# Patient Record
Sex: MALE | Race: BLACK OR AFRICAN AMERICAN | NOT HISPANIC OR LATINO | Employment: FULL TIME | URBAN - METROPOLITAN AREA
[De-identification: names, ages, dates, MRNs, and addresses within clinical notes are randomized per-mention and may not be internally consistent; named-entity substitution may affect disease eponyms.]

---

## 2022-01-08 ENCOUNTER — HOSPITAL ENCOUNTER (EMERGENCY)
Facility: HOSPITAL | Age: 37
Discharge: HOME | End: 2022-01-08
Attending: EMERGENCY MEDICINE
Payer: MEDICAID

## 2022-01-08 VITALS
RESPIRATION RATE: 18 BRPM | SYSTOLIC BLOOD PRESSURE: 120 MMHG | TEMPERATURE: 98 F | OXYGEN SATURATION: 100 % | HEIGHT: 72 IN | DIASTOLIC BLOOD PRESSURE: 68 MMHG | WEIGHT: 175 LBS | BODY MASS INDEX: 23.7 KG/M2 | HEART RATE: 80 BPM

## 2022-01-08 DIAGNOSIS — S30.22XA TRAUMATIC SCROTAL HEMATOMA, INITIAL ENCOUNTER: Primary | ICD-10-CM

## 2022-01-08 DIAGNOSIS — S39.94XA INJURY TO SCROTUM, INITIAL ENCOUNTER: ICD-10-CM

## 2022-01-08 LAB
BILIRUB UR QL STRIP: NEGATIVE
CLARITY UR: CLEAR
COLOR UR: YELLOW
GLUCOSE UR QL STRIP: NEGATIVE
HGB UR QL STRIP: ABNORMAL
KETONES UR QL STRIP: NEGATIVE
LEUKOCYTE ESTERASE UR QL STRIP: NEGATIVE
NITRITE UR QL STRIP: NEGATIVE
PH UR STRIP: 6 [PH] (ref 5–8)
PROT UR QL STRIP: NEGATIVE
SP GR UR STRIP: >=1.03 (ref 1–1.03)
URN SPEC COLLECT METH UR: ABNORMAL
UROBILINOGEN UR STRIP-ACNC: NEGATIVE EU/DL

## 2022-01-08 PROCEDURE — 25000003 PHARM REV CODE 250: Performed by: EMERGENCY MEDICINE

## 2022-01-08 PROCEDURE — 81003 URINALYSIS AUTO W/O SCOPE: CPT | Performed by: EMERGENCY MEDICINE

## 2022-01-08 PROCEDURE — 99284 EMERGENCY DEPT VISIT MOD MDM: CPT | Mod: 25

## 2022-01-08 RX ORDER — HYDROCODONE BITARTRATE AND ACETAMINOPHEN 7.5; 325 MG/1; MG/1
1 TABLET ORAL ONCE
Status: COMPLETED | OUTPATIENT
Start: 2022-01-09 | End: 2022-01-08

## 2022-01-08 RX ORDER — HYDROCODONE BITARTRATE AND ACETAMINOPHEN 7.5; 325 MG/1; MG/1
1 TABLET ORAL EVERY 6 HOURS PRN
Qty: 12 TABLET | Refills: 0 | Status: SHIPPED | OUTPATIENT
Start: 2022-01-08 | End: 2022-01-11

## 2022-01-08 RX ADMIN — HYDROCODONE BITARTRATE AND ACETAMINOPHEN 1 TABLET: 7.5; 325 TABLET ORAL at 11:01

## 2022-01-08 NOTE — Clinical Note
"Duane"Gonzalo Ocasio was seen and treated in our emergency department on 1/8/2022.  He may return to work on 01/17/2022.       If you have any questions or concerns, please don't hesitate to call.      Gaurav Nielsen MD"

## 2022-01-09 NOTE — ED NOTES
Duane Ocasio presents to the ED with c/o scrotal pain. Patient reports a fall yesterday, when he fell onto a pipe. Patient reports pain to area between scrotum and a red tinge drainage from wound to site. He denies any difficulty urinating. Mucous membranes are pink and moist. Skin is warm, dry and intact.

## 2022-01-09 NOTE — ED PROVIDER NOTES
Encounter Date: 1/8/2022       History     Chief Complaint   Patient presents with    Testicle Pain     Patient reporting fall at work yesterday injuring testicle. Patient reporting swelling and hardness to area between testicles      This patient is a 36-year-old male without significant past medical history presenting to the emergency department complaints of pain in his scrotum.  He reports approximately 36 hours ago he fell onto a pipe and a straddle mechanism resulting in pain on the front of his scrotum in between his testicles.  He reports some swelling in this area developed last night.  He endorses icing the area last night and then soaking in Epsom salt bath this morning.  He reports the sensation that something busted and then some foul reddish tinged fluid came out from a wound in the center of the swelling between his 2 testicles on the front of his scrotum.  He denies blood in his urine, difficulty urinating, taking anything orally for pain.  He denies any complaints regarding his penis or testicles prior to injury.        Review of patient's allergies indicates:  No Known Allergies  History reviewed. No pertinent past medical history.  History reviewed. No pertinent surgical history.  History reviewed. No pertinent family history.     Review of Systems   Constitutional: Negative for fever.   Respiratory: Negative for shortness of breath.    Cardiovascular: Negative for chest pain.   Gastrointestinal: Negative for abdominal pain.   Genitourinary: Positive for testicular pain. Negative for difficulty urinating.   Musculoskeletal: Negative for myalgias.   Skin: Positive for wound.   Allergic/Immunologic: Negative for immunocompromised state.   Hematological: Does not bruise/bleed easily.   Psychiatric/Behavioral: Negative for confusion.       Physical Exam     Initial Vitals [01/08/22 2152]   BP Pulse Resp Temp SpO2   122/74 82 18 98.3 °F (36.8 °C) 99 %      MAP       --         Physical  Exam    Nursing note and vitals reviewed.  Constitutional: He appears well-developed. No distress.   HENT:   Head: Normocephalic and atraumatic.   Eyes: Conjunctivae and EOM are normal.   Neck: Neck supple.   Normal range of motion.  Cardiovascular: Normal rate and regular rhythm.   Pulmonary/Chest: No respiratory distress. He has no wheezes.   Abdominal: He exhibits no distension. There is no abdominal tenderness.   Genitourinary:    Penis normal.      Genitourinary Comments: 2 centimeter x 3 centimeter area of induration and swelling anterior inferior aspect of the scrotum, midline, and the front of this mass is a small opening emanating red-tinged clear fluid  bilateral testes are posterior to the swelling, no tenderness of palpation, normal lie     Musculoskeletal:         General: Normal range of motion.      Cervical back: Normal range of motion and neck supple.     Neurological: He is alert and oriented to person, place, and time. He has normal strength.   Skin: Skin is warm and dry. Capillary refill takes less than 2 seconds.   Psychiatric: He has a normal mood and affect. Thought content normal.         ED Course   Procedures  Labs Reviewed   URINALYSIS, REFLEX TO URINE CULTURE - Abnormal; Notable for the following components:       Result Value    Specific Gravity, UA >=1.030 (*)     Occult Blood UA Trace (*)     All other components within normal limits    Narrative:     Specimen Source->Urine          Imaging Results           US Scrotum And Testicles (Final result)  Result time 01/08/22 22:58:52    Final result by Jonas Gonzalez MD (01/08/22 22:58:52)                 Impression:      Normal ultrasound of the testicles.    9 mm hypoechoic nodular focus between the 2 testicles.  This may represent a small hematoma or other stromal nodule such as a fibrous pseudotumor.  Non emergent urology follow-up may be beneficial.    This report was flagged in Epic as abnormal.      Electronically signed  by: Jonas Gonzalez  Date:    01/08/2022  Time:    22:58             Narrative:    EXAMINATION:  US SCROTUM AND TESTICLES    CLINICAL HISTORY:  Unspecified injury of external genitals, initial encounter    TECHNIQUE:  Sonography of the scrotum and testes.    COMPARISON:  None.    FINDINGS:  Right Testicle:    *Size: 4.5 x 2.6 x 2.1 cm  *Appearance: Normal.  *Flow: Normal arterial and venous flow  *Epididymis: Normal.  *Hydrocele: None.  *Varicocele: None.  .    Left Testicle:    *Size: 3.9 x 2.5 x 1.9 cm  *Appearance: Normal.  *Flow: Normal arterial and venous flow  *Epididymis: Normal.  *Hydrocele: None.  *Varicocele: None.  .    Other findings: Between the 2 testicles in the mid scrotum there is a small nearly isoechoic nodule with posterior acoustical enhancement measuring approximately 9 mm.  It has no significant vascularity on color flow or Doppler interrogation..                                 Medications   HYDROcodone-acetaminophen 7.5-325 mg per tablet 1 tablet (1 tablet Oral Given 1/8/22 2917)     Medical Decision Making:   ED Management:  This patient was rapidly assessed shortly after arrival.  Initial vital signs are stable.  Patient received Norco for pain control here.  Concern for straddle injury with a small superficial hematoma on the anterior aspect of the scrotum.  Ultrasound reveals normal findings of both testicles.  Note is made of a 9 mm hypoechoic nodular focus between the 2 testicles which I think represents a small superficial hematoma.  Urinalysis is within normal limits with the exception of a trace amount of blood.  I do not suspect significant urethral injury or concomitant scrotal abscess.  I do not think the patient incurred an additional bladder injury.  These findings and history were conveyed to the on-call urologist, Dr. Singh, who recommends pelvic rest, icing.  Patient additionally educated about scrotal support with briefs.  He will be discharged with p.o. analgesic  medication and asked to follow-up with this urologist as soon as possible.  He is asked to return to the emergency department immediately for any new, concerning, or worsening symptoms.  Patient was agreeable with this plan and was discharged in stable condition with his significant other as a .                      Clinical Impression:   Final diagnoses:  [S39.94XA] Injury to scrotum, initial encounter  [S30.22XA] Traumatic scrotal hematoma, initial encounter (Primary)          ED Disposition Condition    Discharge Stable        ED Prescriptions     Medication Sig Dispense Start Date End Date Auth. Provider    HYDROcodone-acetaminophen (NORCO) 7.5-325 mg per tablet Take 1 tablet by mouth every 6 (six) hours as needed for Pain. 12 tablet 1/8/2022 1/11/2022 Gaurav Nielsen MD        Follow-up Information     Follow up With Specialties Details Why Contact Info    Alonzo Singh MD Urology Schedule an appointment as soon as possible for a visit   [address] [facility]  [address] [place] [id number]  [phone]             Gaurav Nielsen MD  01/09/22 0254

## 2022-01-09 NOTE — ED NOTES
Upon discharge, patient is AAOx4, no cardiac or respiratory complications. Follow up care and  Medications have been reviewed with patient and has been instructed to return to the ER if needed. Patient verbalized understanding and ambulated to the lobby without difficulty. ROSALINDA ALFARO.

## 2022-03-29 ENCOUNTER — LAB VISIT (OUTPATIENT)
Dept: PRIMARY CARE CLINIC | Facility: OTHER | Age: 37
End: 2022-03-29
Payer: MEDICAID

## 2022-03-29 DIAGNOSIS — Z20.822 ENCOUNTER FOR LABORATORY TESTING FOR COVID-19 VIRUS: ICD-10-CM

## 2022-03-29 PROCEDURE — U0003 INFECTIOUS AGENT DETECTION BY NUCLEIC ACID (DNA OR RNA); SEVERE ACUTE RESPIRATORY SYNDROME CORONAVIRUS 2 (SARS-COV-2) (CORONAVIRUS DISEASE [COVID-19]), AMPLIFIED PROBE TECHNIQUE, MAKING USE OF HIGH THROUGHPUT TECHNOLOGIES AS DESCRIBED BY CMS-2020-01-R: HCPCS | Performed by: INTERNAL MEDICINE

## 2022-03-30 LAB
SARS-COV-2 RNA RESP QL NAA+PROBE: NOT DETECTED
SARS-COV-2- CYCLE NUMBER: NORMAL

## 2022-05-03 ENCOUNTER — LAB VISIT (OUTPATIENT)
Dept: PRIMARY CARE CLINIC | Facility: OTHER | Age: 37
End: 2022-05-03
Payer: MEDICAID

## 2022-05-03 DIAGNOSIS — Z20.822 ENCOUNTER FOR LABORATORY TESTING FOR COVID-19 VIRUS: ICD-10-CM

## 2022-05-03 PROCEDURE — U0003 INFECTIOUS AGENT DETECTION BY NUCLEIC ACID (DNA OR RNA); SEVERE ACUTE RESPIRATORY SYNDROME CORONAVIRUS 2 (SARS-COV-2) (CORONAVIRUS DISEASE [COVID-19]), AMPLIFIED PROBE TECHNIQUE, MAKING USE OF HIGH THROUGHPUT TECHNOLOGIES AS DESCRIBED BY CMS-2020-01-R: HCPCS | Performed by: INTERNAL MEDICINE

## 2022-05-04 LAB
SARS-COV-2 RNA RESP QL NAA+PROBE: NOT DETECTED
SARS-COV-2- CYCLE NUMBER: NORMAL

## 2024-03-11 ENCOUNTER — HOSPITAL ENCOUNTER (EMERGENCY)
Facility: HOSPITAL | Age: 39
Discharge: HOME | End: 2024-03-11
Attending: EMERGENCY MEDICINE
Payer: MEDICAID

## 2024-03-11 VITALS
SYSTOLIC BLOOD PRESSURE: 152 MMHG | RESPIRATION RATE: 20 BRPM | DIASTOLIC BLOOD PRESSURE: 97 MMHG | HEART RATE: 82 BPM | BODY MASS INDEX: 24.38 KG/M2 | OXYGEN SATURATION: 99 % | HEIGHT: 72 IN | WEIGHT: 180 LBS | TEMPERATURE: 99 F

## 2024-03-11 DIAGNOSIS — S62.354A CLOSED NONDISPLACED FRACTURE OF SHAFT OF FOURTH METACARPAL BONE OF RIGHT HAND, INITIAL ENCOUNTER: Primary | ICD-10-CM

## 2024-03-11 PROCEDURE — 99283 EMERGENCY DEPT VISIT LOW MDM: CPT | Mod: 25

## 2024-03-11 PROCEDURE — 29125 APPL SHORT ARM SPLINT STATIC: CPT | Mod: RT

## 2024-03-11 RX ORDER — HYDROCODONE BITARTRATE AND ACETAMINOPHEN 5; 325 MG/1; MG/1
1 TABLET ORAL EVERY 4 HOURS PRN
Qty: 10 TABLET | Refills: 0 | Status: SHIPPED | OUTPATIENT
Start: 2024-03-11 | End: 2024-03-19 | Stop reason: SDUPTHER

## 2024-03-11 RX ORDER — IBUPROFEN 400 MG/1
400 TABLET ORAL
Status: DISCONTINUED | OUTPATIENT
Start: 2024-03-11 | End: 2024-03-12 | Stop reason: HOSPADM

## 2024-03-12 NOTE — DISCHARGE INSTRUCTIONS
Motrin for mild pain and swelling   Norco as directed if needed for severe pain  Follow-up as directed for definitive care   Return for any concerns

## 2024-03-12 NOTE — ED PROVIDER NOTES
Encounter Date: 3/11/2024       History     Chief Complaint   Patient presents with    Hand Injury     Fell off chair and caught self with right hand. Top of hand pain, denies wrist pain     Duane Ocasio is a 38 y.o. right hand dominant male who presents to the emergency department for diffuse R hand pain and swelling after falling from a chair and bracing himself with his R hand. Patient has no other complaints at this time. Denies any LOC or head trauma.        Review of patient's allergies indicates:  No Known Allergies  History reviewed. No pertinent past medical history.  History reviewed. No pertinent surgical history.  History reviewed. No pertinent family history.     Review of Systems   Constitutional: Negative.  Negative for chills and fever.   HENT: Negative.  Negative for congestion.    Eyes: Negative.    Respiratory: Negative.  Negative for cough and shortness of breath.    Cardiovascular: Negative.  Negative for chest pain.   Gastrointestinal: Negative.  Negative for abdominal distention and abdominal pain.   Endocrine: Negative.    Genitourinary: Negative.  Negative for difficulty urinating.   Musculoskeletal:  Positive for joint swelling.        R hand pain   Skin: Negative.    Allergic/Immunologic: Negative.    Neurological: Negative.  Negative for dizziness, weakness, light-headedness, numbness and headaches.   Hematological: Negative.    Psychiatric/Behavioral:  Negative for agitation and confusion. The patient is not nervous/anxious.        Physical Exam     Initial Vitals [03/11/24 2139]   BP Pulse Resp Temp SpO2   (!) 152/97 82 20 99.1 °F (37.3 °C) 99 %      MAP       --         Physical Exam    Constitutional: He appears well-developed and well-nourished.   HENT:   Head: Normocephalic and atraumatic.   Eyes: Conjunctivae and EOM are normal.   Neck: Neck supple.   Normal range of motion.  Cardiovascular:  Normal rate, regular rhythm, normal heart sounds and intact distal pulses.            Pulmonary/Chest: Breath sounds normal.   Abdominal: Abdomen is soft. Bowel sounds are normal.   Musculoskeletal:      Right wrist: Normal.      Left wrist: Normal.      Right hand: Swelling and tenderness (diffuse) present. No deformity or lacerations. Decreased range of motion (secondary to pain). Normal sensation. Normal capillary refill. Normal pulse.      Left hand: Normal.      Cervical back: Normal range of motion and neck supple.      Comments: No open wounds noted     Neurological: He is alert and oriented to person, place, and time.   Skin: Skin is warm and dry. Capillary refill takes less than 2 seconds.   Psychiatric: He has a normal mood and affect. Thought content normal.         ED Course   Splint Application    Date/Time: 3/18/2024 3:31 PM    Performed by: Ruba Billings FNP  Authorized by: Berenice Mcintyre MD  Location details: right hand  Splint type: ulnar gutter  Supplies used: Ortho-Glass  Post-procedure: The splinted body part was neurovascularly unchanged following the procedure.  Patient tolerance: Patient tolerated the procedure well with no immediate complications        Labs Reviewed - No data to display       Imaging Results              X-Ray Hand 3 view Right (Final result)  Result time 03/12/24 07:54:11      Final result by Keaton Pradhan MD (03/12/24 07:54:11)                   Narrative:    CLINICAL HISTORY:  38 years (1985) Male fall    TECHNIQUE:  XR HAND 3 OR MORE VIEWS RIGHT. 3 view(s) obtained .    COMPARISON:  None available.    FINDINGS:  Oblique fracture through the mid diaphysis of the fourth metacarpal with approximately 2 mm of dorsal displacement of the distal shaft and less than 15 degrees of angulation, apex-dorsal. There is soft tissue swelling over the fracture with no radiopaque foreign body identified.    IMPRESSION:  Fourth metacarpal fracture.                  .    Electronically signed by:  Keaton Pradhan MD  03/12/2024 07:54 AM CDT  Workstation: [id number]                      Wet Read by Berenice Mcintyre MD (03/11/24 22:46:23, [facility] - Emergency Dept, Emergency Medicine)    Right 4th metacarpal spiral fracture with minimal displacement                                      Medications - No data to display    Medical Decision Making  Duane Ocasio is a 38 y.o. right hand dominant male who presents to the emergency department for diffuse R hand pain and swelling after falling from a chair and bracing himself with his R hand. Patient has no other complaints at this time. Denies any LOC or head trauma.      Considerations include, but are not limited to fracture, strain, contusion      Duane Ocasio is a 38 y.o. male who is right-hand dominant who presents to the emergency department for diffuse R hand pain and swelling after breaking a fall from a chair with his hand. He denies any pain elsewhere. He denies fight bite injury. On physical exam, patient had DROM secondary to pain and mild swelling to the dorsal aspect of his hand. No open wounds to the hand. Distal pulses intact. X-ray of his R hand was showed 4th metacarpal fracture. Patient was placed in an ulnar gutter splint and was instructed to follow up with orthopedics. Return precautions were given.      Attending Note: I discussed the patient's care with Advanced Practice Clinician.  I reviewed their note and agree with the history, physical, assessment, diagnosis, treatment, all procedures performed, xray and EKG interpretations and discharge plan provided by the Advanced Practice Clinician. My overall impression is right 4th metacarpal fracture. Placed in an ulnar gutter splint and will be referred to ortho.   The patient has been instructed to follow up with their physician or the one provided as well as specific return precautions.   Berenice Mcintyre M.D. 3/11/2024 10:46 PM        Amount and/or Complexity of Data Reviewed  Radiology: ordered and independent  interpretation performed. Decision-making details documented in ED Course.    Risk  Prescription drug management.                                      Clinical Impression:  Final diagnoses:  [S66.001C] Closed nondisplaced fracture of shaft of fourth metacarpal bone of right hand, initial encounter (Primary)          ED Disposition Condition    Discharge Stable          ED Prescriptions       Medication Sig Dispense Start Date End Date Auth. Provider    HYDROcodone-acetaminophen (NORCO) 5-325 mg per tablet Take 1 tablet by mouth every 4 (four) hours as needed for Pain. 10 tablet 3/11/2024 3/21/2024 Ruba Billings FNP          Follow-up Information       Follow up With Specialties Details Why Contact Info    Claudio Dean MD Orthopedic Surgery, Surgery, Sports Medicine Schedule an appointment as soon as possible for a visit in 3 days  [address] [facility] [address]  [facility] [address]  [phone]               Ruba Billings FNP  03/11/24 2633       Ruba Billings FNP  03/18/24 1533       Berenice Mcintyre MD  03/19/24 2065

## 2024-03-19 ENCOUNTER — OFFICE VISIT (OUTPATIENT)
Dept: ORTHOPEDICS | Facility: CLINIC | Age: 39
End: 2024-03-19
Payer: MEDICAID

## 2024-03-19 VITALS — WEIGHT: 179.88 LBS | HEIGHT: 72 IN | BODY MASS INDEX: 24.36 KG/M2

## 2024-03-19 DIAGNOSIS — S62.324A CLOSED DISPLACED FRACTURE OF SHAFT OF FOURTH METACARPAL BONE OF RIGHT HAND, INITIAL ENCOUNTER: Primary | ICD-10-CM

## 2024-03-19 DIAGNOSIS — S62.354A CLOSED NONDISPLACED FRACTURE OF SHAFT OF FOURTH METACARPAL BONE OF RIGHT HAND, INITIAL ENCOUNTER: ICD-10-CM

## 2024-03-19 PROCEDURE — 99203 OFFICE O/P NEW LOW 30 MIN: CPT | Mod: S$GLB,,, | Performed by: ORTHOPAEDIC SURGERY

## 2024-03-19 PROCEDURE — 1160F RVW MEDS BY RX/DR IN RCRD: CPT | Mod: CPTII,S$GLB,, | Performed by: ORTHOPAEDIC SURGERY

## 2024-03-19 PROCEDURE — 3008F BODY MASS INDEX DOCD: CPT | Mod: CPTII,S$GLB,, | Performed by: ORTHOPAEDIC SURGERY

## 2024-03-19 PROCEDURE — 1159F MED LIST DOCD IN RCRD: CPT | Mod: CPTII,S$GLB,, | Performed by: ORTHOPAEDIC SURGERY

## 2024-03-19 RX ORDER — HYDROCODONE BITARTRATE AND ACETAMINOPHEN 5; 325 MG/1; MG/1
1 TABLET ORAL EVERY 6 HOURS PRN
Qty: 28 TABLET | Refills: 0 | Status: SHIPPED | OUTPATIENT
Start: 2024-03-19 | End: 2024-03-21

## 2024-03-19 RX ORDER — CEFAZOLIN SODIUM 2 G/50ML
2 SOLUTION INTRAVENOUS
Status: CANCELLED | OUTPATIENT
Start: 2024-03-19

## 2024-03-19 NOTE — H&P (VIEW-ONLY)
[facility]    Subjective:     Chief Complaint:   Chief Complaint   Patient presents with    Right Hand - Pain     Patient is here with complaints of Right hand 4th metacarpal fracture, states he fell from a chair and braced with his hand, ER visit 3.11.24, states his pain is worse than at his ER visit, Pain medication not helping.        Past Medical History:   Diagnosis Date    Asthma        History reviewed. No pertinent surgical history.    Current Outpatient Medications   Medication Sig    HYDROcodone-acetaminophen (NORCO) 5-325 mg per tablet Take 1 tablet by mouth every 6 (six) hours as needed for Pain.     No current facility-administered medications for this visit.       Review of patient's allergies indicates:   Allergen Reactions    Spearmint Anaphylaxis       History reviewed. No pertinent family history.    Social History     Socioeconomic History    Marital status: Single   Tobacco Use    Smoking status: Never    Smokeless tobacco: Never       History of present illness:  38-year-old male works as a , right-hand dominant, fell while holding his son onto his right hand.  Diagnosed with a 4th metacarpal shaft fracture.  March 11th.      Review of Systems:    Constitution: Negative for chills, fever, and sweats.  Negative for unexplained weight loss.    HENT:  Negative for headaches and blurry vision.    Cardiovascular:Negative for chest pain or irregular heart beat. Negative for hypertension.    Respiratory:  Negative for cough and shortness of breath.    Gastrointestinal: Negative for abdominal pain, heartburn, melena, nausea, and vomitting.    Genitourinary:  Negative bladder incontinence and dysuria.    Musculoskeletal:  See HPI for details.     Neurological: Negative for numbness.    Psychiatric/Behavioral: Negative for depression.  The patient is not nervous/anxious.      Endocrine: Negative for polyuria    Hematologic/Lymphatic: Negative for bleeding problem.  Does not bruise/bleed  "easily.    Skin: Negative for poor would healing and rash    Objective:      Physical Examination:    Vital Signs:  There were no vitals filed for this visit.    Body mass index is 24.4 kg/m².    This a well-developed, well nourished patient in no acute distress.  They are alert and oriented and cooperative to examination.        Examination of the right hand, diffusely swollen tender over the dorsum right hand between the 3rd and 5th metacarpals.  Pertinent New Results:    XRAY Report / Interpretation:   AP lateral oblique views of the right hand reviewed today demonstrate a mildly displaced fracture of the right 4th metacarpal.    Assessment/Plan:      Fourth metacarpal fracture, I recommended upper inner reduction internal fixation for the fracture.  Risks and benefits were discussed with the patient in great detail.  We will do this on Monday.  We placed him into an ulnar gutter splint.  I refilled his pain medication.    Chris Amor, Physician Assistant, served in the capacity as a "scribe" for this patient encounter.  A "face-to-face" encounter occurred with Dr. Claudio Daen on this date.  The treatment plan and medical decision-making is outlined above. Patient was seen and examined with a chaperone.       This note was created using Dragon voice recognition software that occasionally misinterpreted phrases or words.          "

## 2024-03-19 NOTE — PROGRESS NOTES
[facility]    Subjective:     Chief Complaint:   Chief Complaint   Patient presents with    Right Hand - Pain     Patient is here with complaints of Right hand 4th metacarpal fracture, states he fell from a chair and braced with his hand, ER visit 3.11.24, states his pain is worse than at his ER visit, Pain medication not helping.        Past Medical History:   Diagnosis Date    Asthma        History reviewed. No pertinent surgical history.    Current Outpatient Medications   Medication Sig    HYDROcodone-acetaminophen (NORCO) 5-325 mg per tablet Take 1 tablet by mouth every 6 (six) hours as needed for Pain.     No current facility-administered medications for this visit.       Review of patient's allergies indicates:   Allergen Reactions    Spearmint Anaphylaxis       History reviewed. No pertinent family history.    Social History     Socioeconomic History    Marital status: Single   Tobacco Use    Smoking status: Never    Smokeless tobacco: Never       History of present illness:  38-year-old male works as a , right-hand dominant, fell while holding his son onto his right hand.  Diagnosed with a 4th metacarpal shaft fracture.  March 11th.      Review of Systems:    Constitution: Negative for chills, fever, and sweats.  Negative for unexplained weight loss.    HENT:  Negative for headaches and blurry vision.    Cardiovascular:Negative for chest pain or irregular heart beat. Negative for hypertension.    Respiratory:  Negative for cough and shortness of breath.    Gastrointestinal: Negative for abdominal pain, heartburn, melena, nausea, and vomitting.    Genitourinary:  Negative bladder incontinence and dysuria.    Musculoskeletal:  See HPI for details.     Neurological: Negative for numbness.    Psychiatric/Behavioral: Negative for depression.  The patient is not nervous/anxious.      Endocrine: Negative for polyuria    Hematologic/Lymphatic: Negative for bleeding problem.  Does not bruise/bleed  "easily.    Skin: Negative for poor would healing and rash    Objective:      Physical Examination:    Vital Signs:  There were no vitals filed for this visit.    Body mass index is 24.4 kg/m².    This a well-developed, well nourished patient in no acute distress.  They are alert and oriented and cooperative to examination.        Examination of the right hand, diffusely swollen tender over the dorsum right hand between the 3rd and 5th metacarpals.  Pertinent New Results:    XRAY Report / Interpretation:   AP lateral oblique views of the right hand reviewed today demonstrate a mildly displaced fracture of the right 4th metacarpal.    Assessment/Plan:      Fourth metacarpal fracture, I recommended upper inner reduction internal fixation for the fracture.  Risks and benefits were discussed with the patient in great detail.  We will do this on Monday.  We placed him into an ulnar gutter splint.  I refilled his pain medication.    Chris Amor, Physician Assistant, served in the capacity as a "scribe" for this patient encounter.  A "face-to-face" encounter occurred with Dr. Claudio Dean on this date.  The treatment plan and medical decision-making is outlined above. Patient was seen and examined with a chaperone.       This note was created using Dragon voice recognition software that occasionally misinterpreted phrases or words.          "

## 2024-03-21 DIAGNOSIS — S62.324A CLOSED DISPLACED FRACTURE OF SHAFT OF FOURTH METACARPAL BONE OF RIGHT HAND, INITIAL ENCOUNTER: Primary | ICD-10-CM

## 2024-03-21 RX ORDER — OXYCODONE AND ACETAMINOPHEN 7.5; 325 MG/1; MG/1
1 TABLET ORAL EVERY 6 HOURS PRN
Qty: 28 TABLET | Refills: 0 | Status: SHIPPED | OUTPATIENT
Start: 2024-03-21 | End: 2024-04-08 | Stop reason: SDUPTHER

## 2024-03-23 ENCOUNTER — ANESTHESIA EVENT (OUTPATIENT)
Dept: SURGERY | Facility: HOSPITAL | Age: 39
End: 2024-03-23
Payer: MEDICAID

## 2024-03-25 ENCOUNTER — HOSPITAL ENCOUNTER (OUTPATIENT)
Facility: HOSPITAL | Age: 39
Discharge: HOME | End: 2024-03-25
Attending: ORTHOPAEDIC SURGERY | Admitting: ORTHOPAEDIC SURGERY
Payer: MEDICAID

## 2024-03-25 ENCOUNTER — ANESTHESIA (OUTPATIENT)
Dept: SURGERY | Facility: HOSPITAL | Age: 39
End: 2024-03-25
Payer: MEDICAID

## 2024-03-25 DIAGNOSIS — S62.324A CLOSED DISPLACED FRACTURE OF SHAFT OF FOURTH METACARPAL BONE OF RIGHT HAND, INITIAL ENCOUNTER: Primary | ICD-10-CM

## 2024-03-25 PROCEDURE — 63600175 PHARM REV CODE 636 W HCPCS: Performed by: ANESTHESIOLOGY

## 2024-03-25 PROCEDURE — 63600175 PHARM REV CODE 636 W HCPCS: Performed by: NURSE ANESTHETIST, CERTIFIED REGISTERED

## 2024-03-25 PROCEDURE — 63600175 PHARM REV CODE 636 W HCPCS: Performed by: ORTHOPAEDIC SURGERY

## 2024-03-25 PROCEDURE — 26615 TREAT METACARPAL FRACTURE: CPT | Mod: RT,,, | Performed by: ORTHOPAEDIC SURGERY

## 2024-03-25 PROCEDURE — D9220A PRA ANESTHESIA: Mod: ANES,,, | Performed by: ANESTHESIOLOGY

## 2024-03-25 PROCEDURE — 36000709 HC OR TIME LEV III EA ADD 15 MIN: Performed by: ORTHOPAEDIC SURGERY

## 2024-03-25 PROCEDURE — 25000003 PHARM REV CODE 250: Performed by: ORTHOPAEDIC SURGERY

## 2024-03-25 PROCEDURE — 71000015 HC POSTOP RECOV 1ST HR: Performed by: ORTHOPAEDIC SURGERY

## 2024-03-25 PROCEDURE — C1713 ANCHOR/SCREW BN/BN,TIS/BN: HCPCS | Performed by: ORTHOPAEDIC SURGERY

## 2024-03-25 PROCEDURE — 25000003 PHARM REV CODE 250: Performed by: ANESTHESIOLOGY

## 2024-03-25 PROCEDURE — 27200665 HC NERVE BLOCK NEEDLE/ CATHETER: Performed by: ANESTHESIOLOGY

## 2024-03-25 PROCEDURE — 37000008 HC ANESTHESIA 1ST 15 MINUTES: Performed by: ORTHOPAEDIC SURGERY

## 2024-03-25 PROCEDURE — C9290 INJ, BUPIVACAINE LIPOSOME: HCPCS | Performed by: ANESTHESIOLOGY

## 2024-03-25 PROCEDURE — 37000009 HC ANESTHESIA EA ADD 15 MINS: Performed by: ORTHOPAEDIC SURGERY

## 2024-03-25 PROCEDURE — 71000039 HC RECOVERY, EACH ADD'L HOUR: Performed by: ORTHOPAEDIC SURGERY

## 2024-03-25 PROCEDURE — 94799 UNLISTED PULMONARY SVC/PX: CPT | Mod: XB

## 2024-03-25 PROCEDURE — 64415 NJX AA&/STRD BRCH PLXS IMG: CPT | Performed by: ANESTHESIOLOGY

## 2024-03-25 PROCEDURE — 36000708 HC OR TIME LEV III 1ST 15 MIN: Performed by: ORTHOPAEDIC SURGERY

## 2024-03-25 PROCEDURE — 71000033 HC RECOVERY, INTIAL HOUR: Performed by: ORTHOPAEDIC SURGERY

## 2024-03-25 PROCEDURE — 25000003 PHARM REV CODE 250: Performed by: NURSE ANESTHETIST, CERTIFIED REGISTERED

## 2024-03-25 PROCEDURE — D9220A PRA ANESTHESIA: Mod: CRNA,,, | Performed by: NURSE ANESTHETIST, CERTIFIED REGISTERED

## 2024-03-25 PROCEDURE — 27201423 OPTIME MED/SURG SUP & DEVICES STERILE SUPPLY: Performed by: ORTHOPAEDIC SURGERY

## 2024-03-25 PROCEDURE — C1769 GUIDE WIRE: HCPCS | Performed by: ORTHOPAEDIC SURGERY

## 2024-03-25 DEVICE — SCREW BONE CORT 2X12MM: Type: IMPLANTABLE DEVICE | Site: HAND | Status: FUNCTIONAL

## 2024-03-25 DEVICE — SCREW CORTEX ST STARDRIVE 11X2: Type: IMPLANTABLE DEVICE | Site: HAND | Status: FUNCTIONAL

## 2024-03-25 DEVICE — SCREW STRDRV REC T6 2X8MM SS: Type: IMPLANTABLE DEVICE | Site: HAND | Status: FUNCTIONAL

## 2024-03-25 DEVICE — SCREW CORTEX ST T4 1.5X10MM: Type: IMPLANTABLE DEVICE | Site: HAND | Status: FUNCTIONAL

## 2024-03-25 DEVICE — IMPLANTABLE DEVICE: Type: IMPLANTABLE DEVICE | Site: HAND | Status: FUNCTIONAL

## 2024-03-25 RX ORDER — CELECOXIB 100 MG/1
400 CAPSULE ORAL
Status: DISCONTINUED | OUTPATIENT
Start: 2024-03-25 | End: 2024-03-25

## 2024-03-25 RX ORDER — PROPOFOL 10 MG/ML
VIAL (ML) INTRAVENOUS
Status: DISCONTINUED | OUTPATIENT
Start: 2024-03-25 | End: 2024-03-25

## 2024-03-25 RX ORDER — ACETAMINOPHEN 10 MG/ML
INJECTION, SOLUTION INTRAVENOUS
Status: DISCONTINUED | OUTPATIENT
Start: 2024-03-25 | End: 2024-03-25

## 2024-03-25 RX ORDER — LIDOCAINE HYDROCHLORIDE 20 MG/ML
INJECTION INTRAVENOUS
Status: DISCONTINUED | OUTPATIENT
Start: 2024-03-25 | End: 2024-03-25

## 2024-03-25 RX ORDER — FENTANYL CITRATE 50 UG/ML
25 INJECTION, SOLUTION INTRAMUSCULAR; INTRAVENOUS EVERY 5 MIN PRN
Status: COMPLETED | OUTPATIENT
Start: 2024-03-25 | End: 2024-03-25

## 2024-03-25 RX ORDER — DEXMEDETOMIDINE HYDROCHLORIDE 100 UG/ML
INJECTION, SOLUTION INTRAVENOUS
Status: DISCONTINUED | OUTPATIENT
Start: 2024-03-25 | End: 2024-03-25

## 2024-03-25 RX ORDER — ONDANSETRON HYDROCHLORIDE 2 MG/ML
4 INJECTION, SOLUTION INTRAVENOUS ONCE AS NEEDED
Status: DISCONTINUED | OUTPATIENT
Start: 2024-03-25 | End: 2024-03-25 | Stop reason: HOSPADM

## 2024-03-25 RX ORDER — HYDROCODONE BITARTRATE AND ACETAMINOPHEN 5; 325 MG/1; MG/1
1 TABLET ORAL EVERY 6 HOURS PRN
COMMUNITY
End: 2024-04-08

## 2024-03-25 RX ORDER — FENTANYL CITRATE 50 UG/ML
25-200 INJECTION, SOLUTION INTRAMUSCULAR; INTRAVENOUS
Status: DISCONTINUED | OUTPATIENT
Start: 2024-03-25 | End: 2024-03-25 | Stop reason: HOSPADM

## 2024-03-25 RX ORDER — KETOROLAC TROMETHAMINE 30 MG/ML
INJECTION, SOLUTION INTRAMUSCULAR; INTRAVENOUS
Status: DISCONTINUED | OUTPATIENT
Start: 2024-03-25 | End: 2024-03-25

## 2024-03-25 RX ORDER — ONDANSETRON HYDROCHLORIDE 2 MG/ML
INJECTION, SOLUTION INTRAMUSCULAR; INTRAVENOUS
Status: DISCONTINUED | OUTPATIENT
Start: 2024-03-25 | End: 2024-03-25

## 2024-03-25 RX ORDER — OXYCODONE HYDROCHLORIDE 5 MG/1
5 TABLET ORAL ONCE AS NEEDED
Status: COMPLETED | OUTPATIENT
Start: 2024-03-25 | End: 2024-03-25

## 2024-03-25 RX ORDER — ACETAMINOPHEN 500 MG
1000 TABLET ORAL
Status: DISCONTINUED | OUTPATIENT
Start: 2024-03-25 | End: 2024-03-25

## 2024-03-25 RX ORDER — MIDAZOLAM HYDROCHLORIDE 1 MG/ML
2 INJECTION, SOLUTION INTRAMUSCULAR; INTRAVENOUS
Status: DISCONTINUED | OUTPATIENT
Start: 2024-03-25 | End: 2024-03-25 | Stop reason: HOSPADM

## 2024-03-25 RX ORDER — LIDOCAINE HYDROCHLORIDE 10 MG/ML
1 INJECTION, SOLUTION EPIDURAL; INFILTRATION; INTRACAUDAL; PERINEURAL ONCE
Status: DISCONTINUED | OUTPATIENT
Start: 2024-03-25 | End: 2024-03-25 | Stop reason: HOSPADM

## 2024-03-25 RX ORDER — DEXAMETHASONE SODIUM PHOSPHATE 4 MG/ML
INJECTION, SOLUTION INTRA-ARTICULAR; INTRALESIONAL; INTRAMUSCULAR; INTRAVENOUS; SOFT TISSUE
Status: DISCONTINUED | OUTPATIENT
Start: 2024-03-25 | End: 2024-03-25

## 2024-03-25 RX ORDER — BUPIVACAINE HYDROCHLORIDE 5 MG/ML
INJECTION, SOLUTION EPIDURAL; INTRACAUDAL
Status: COMPLETED | OUTPATIENT
Start: 2024-03-25 | End: 2024-03-25

## 2024-03-25 RX ADMIN — MIDAZOLAM 2 MG: 1 INJECTION INTRAMUSCULAR; INTRAVENOUS at 12:03

## 2024-03-25 RX ADMIN — PROPOFOL 200 MG: 10 INJECTION, EMULSION INTRAVENOUS at 12:03

## 2024-03-25 RX ADMIN — ONDANSETRON 4 MG: 2 INJECTION INTRAMUSCULAR; INTRAVENOUS at 01:03

## 2024-03-25 RX ADMIN — FENTANYL CITRATE 50 MCG: 50 INJECTION, SOLUTION INTRAMUSCULAR; INTRAVENOUS at 11:03

## 2024-03-25 RX ADMIN — BUPIVACAINE 10 ML: 13.3 INJECTION, SUSPENSION, LIPOSOMAL INFILTRATION at 11:03

## 2024-03-25 RX ADMIN — FENTANYL CITRATE 50 MCG: 50 INJECTION, SOLUTION INTRAMUSCULAR; INTRAVENOUS at 12:03

## 2024-03-25 RX ADMIN — OXYCODONE 5 MG: 5 TABLET ORAL at 02:03

## 2024-03-25 RX ADMIN — FENTANYL CITRATE 25 MCG: 50 INJECTION, SOLUTION INTRAMUSCULAR; INTRAVENOUS at 02:03

## 2024-03-25 RX ADMIN — GLYCOPYRROLATE 0.2 MG: 0.2 INJECTION, SOLUTION INTRAMUSCULAR; INTRAVITREAL at 12:03

## 2024-03-25 RX ADMIN — KETOROLAC TROMETHAMINE 30 MG: 30 INJECTION, SOLUTION INTRAMUSCULAR; INTRAVENOUS at 01:03

## 2024-03-25 RX ADMIN — LIDOCAINE HYDROCHLORIDE 100 MG: 20 INJECTION, SOLUTION INTRAVENOUS at 12:03

## 2024-03-25 RX ADMIN — FENTANYL CITRATE 25 MCG: 50 INJECTION, SOLUTION INTRAMUSCULAR; INTRAVENOUS at 03:03

## 2024-03-25 RX ADMIN — DEXMEDETOMIDINE HYDROCHLORIDE 10 MCG: 100 INJECTION, SOLUTION INTRAVENOUS at 12:03

## 2024-03-25 RX ADMIN — ACETAMINOPHEN 1000 MG: 10 INJECTION, SOLUTION INTRAVENOUS at 12:03

## 2024-03-25 RX ADMIN — MIDAZOLAM 2 MG: 1 INJECTION INTRAMUSCULAR; INTRAVENOUS at 11:03

## 2024-03-25 RX ADMIN — ONDANSETRON 4 MG: 2 INJECTION INTRAMUSCULAR; INTRAVENOUS at 12:03

## 2024-03-25 RX ADMIN — SODIUM CHLORIDE, SODIUM GLUCONATE, SODIUM ACETATE, POTASSIUM CHLORIDE AND MAGNESIUM CHLORIDE: 526; 502; 368; 37; 30 INJECTION, SOLUTION INTRAVENOUS at 11:03

## 2024-03-25 RX ADMIN — DEXAMETHASONE SODIUM PHOSPHATE 8 MG: 4 INJECTION, SOLUTION INTRA-ARTICULAR; INTRALESIONAL; INTRAMUSCULAR; INTRAVENOUS; SOFT TISSUE at 12:03

## 2024-03-25 RX ADMIN — FENTANYL CITRATE 50 MCG: 50 INJECTION, SOLUTION INTRAMUSCULAR; INTRAVENOUS at 01:03

## 2024-03-25 RX ADMIN — CEFAZOLIN 2 G: 2 INJECTION, POWDER, FOR SOLUTION INTRAMUSCULAR; INTRAVENOUS at 12:03

## 2024-03-25 RX ADMIN — BUPIVACAINE HYDROCHLORIDE 5 ML: 5 INJECTION, SOLUTION EPIDURAL; INTRACAUDAL; PERINEURAL at 11:03

## 2024-03-25 RX ADMIN — DEXMEDETOMIDINE HYDROCHLORIDE 5 MCG: 100 INJECTION, SOLUTION INTRAVENOUS at 01:03

## 2024-03-25 NOTE — OP NOTE
[facility]  Surgery Department  Operative Note    SUMMARY     Date of Procedure: 3/25/2024     Procedure:  Displaced metacarpal fracture 4th right open reduction internal fixation    Surgeon(s) and Role:     * Claudio Dean MD - Primary    Assisting Surgeon:  Jeanette    Pre-Operative Diagnosis: Closed displaced fracture of shaft of fourth metacarpal bone of right hand, initial encounter [S62.324A]    Post-Operative Diagnosis: Post-Op Diagnosis Codes:     * Closed displaced fracture of shaft of fourth metacarpal bone of right hand, initial encounter [S62.324A]    Anesthesia: General      Description of the Findings of the Procedure:  Patient was placed on the operative table in supine position.  The hand was prepped and draped in the usual sterile manner for surgery.  An incision was made directly over the 4th metacarpal.  It was carried down sharply through the skin.  The fracture was easily identified.  The tendons were protected.  The fracture was irrigated free and clamped anatomically.  I now placed 2 lag screws sequentially both of which obtained outstanding bites.  The fracture was now anatomically fixed.  Given the length of the fracture, and the fact that the fracture was very unstable I elected to provide additional fixation by spanning this fracture with a plate.  A 2 mm plate was placed across the fracture and secured to the metacarpal with 4 screws.  The obtained excellent bite.  C-arm confirmed firmed that the screw lengths were perfect and that the fracture was anatomically reduced.  The nailbeds lined up well.  We irrigated.  The tendons were simply allowed to lay back into their natural position.  We closed the subcu with 3-0 Vicryl.  We closed the skin with 4-0 Monocryl.  Sterile dressings were applied along with a splint and the patient was noted to be in stable condition    Complications: No    Estimated Blood Loss (EBL): * No values recorded between 3/25/2024 12:57 PM  and 3/25/2024  1:43 PM *           Implants: * No implants in log *    Specimens:   Specimen (24h ago, onward)      None                    Condition: Good    Disposition: PACU - hemodynamically stable.              Discharge Note    SUMMARY     Admit Date: 3/25/2024    Discharge Date and Time:  03/25/2024 1:43 PM    Hospital Course (synopsis of major diagnoses, care, treatment, and services provided during the course of the hospital stay): Uneventful      Final Diagnosis: Post-Op Diagnosis Codes:     * Closed displaced fracture of shaft of fourth metacarpal bone of right hand, initial encounter [S62.324A]    Disposition: Home or Self Care    Follow Up/Patient Instructions:     Medications:  Reconciled Home Medications:   Current Discharge Medication List        CONTINUE these medications which have NOT CHANGED    Details   HYDROcodone-acetaminophen (NORCO) 5-325 mg per tablet Take 1 tablet by mouth every 6 (six) hours as needed for Pain.      oxyCODONE-acetaminophen (PERCOCET) 7.5-325 mg per tablet Take 1 tablet by mouth every 6 (six) hours as needed for Pain.  Qty: 28 tablet, Refills: 0    Comments: This prescription is for postoperative use for the patient's scheduled ORIF on Monday March 25, 2024.  This is for the meds to bed program.  Associated Diagnoses: Closed displaced fracture of shaft of fourth metacarpal bone of right hand, initial encounter           Discharge Procedure Orders   Diet Adult Regular     Leave dressing on - Keep it clean, dry, and intact until clinic visit     Activity as tolerated      Follow-up Information       Claudio Dean MD Follow up in 1 day(s).    Specialties: Orthopedic Surgery, Surgery, Sports Medicine  Contact information:  [address]  [address] [address]  [phone]

## 2024-03-25 NOTE — DISCHARGE INSTRUCTIONS
Post op instructions for prevention of DVT  What is deep vein thrombosis?  Deep vein thrombosis (DVT) is the medical term for blood clots in the deep veins of the leg.  These blood clots can be dangerous.  A DVT can block a blood vessel and keep blood from getting where it needs to go.  Another problem is that the clot can travel to other parts of the body such as the lungs.  A clot that travels to the lungs is called a pulmonary embolus (PE) and can cause serious problems with breathing which can lead to death.  Am I at risk for DVT/PE?  If you are not very active, you are at risk of DVT.  Anyone confined to bed, sitting for long periods of time, recovering from surgery, etc. increases the risk of DVT.  Other risk factors are cancer diagnosis, certain medications, estrogen replacement in any form,older age, obesity, pregnancy, smoking, history of clotting disorders, and dehydration.  How will I know if I have a DVT?  Swelling in the lower leg  Pain  Warmth, redness, hardness or bulging of the vein  If you have any of these symptoms, call your doctors office right away.  Some people will not have any symptoms until the clot moves to the lungs.  What are the symptoms of a PE?  Panting, shortness of breath, or trouble breathing  Sharp, knife-like chest pain when you breathe  Coughing or coughing up blood  Rapid heartbeat  If you have any of these symptoms or get worse quickly, call 911 for emergency treatment.  How can I prevent a DVT?  Avoid long periods of inactivity and dont cross your legs--get up and walk around every hour or so.  Stay active--walking after surgery is highly encouraged.  This means you should get out of the house and walk in the neighborhood.  Going up and down stairs will not impair healing (unless advised against such activity by your doctor).    Drink plenty of noncaffeinated, nonalcoholic fluids each day to prevent dehydration.  Wear special support stockings, if they have been advised by  your doctor.  If you travel, stop at least once an hour and walk around.  Avoid smoking (assistance with stopping is available through your healthcare provider)  Always notify your doctor if you are not able to follow the post operative instructions that are given to you at the time of discharge.  It may be necessary to prescribe one of the medications available to prevent DVT.Using an Incentive Spirometer    An incentive spirometer is a device that helps you do deep breathing exercises. These exercises expand your lungs, aid in circulation, and help prevent pneumonia. Deep breathing exercises also help you breathe better and improve the function of your lungs by:  Keeping your lungs clear  Strengthening your breathing muscles  Helping prevent respiratory complications or problems  The incentive spirometer gives you a way to take an active part in recover. A nurse or therapist will teach you breathing exercises. To do these exercises, you will breathe in through your mouth and not your nose. The incentive spirometer only works correctly if you breathe in through your mouth.  Steps to clear lungs  Step 1. Exhale normally. Then, inhale normally.  Relax and breathe out.  Step 2. Place your lips tightly around the mouthpiece.  Make sure the device is upright and not tilted.  Step 3. Inhale as much air as you can through the mouthpiece (don't breath through your nose).  Inhale slowly and deeply.  Hold your breath long enough to keep the balls or disk raised for at least 3 to 5 seconds, or as instructed by your healthcare provider.  Some spirometers have an indicator to let you know that you are breathing in too fast. If the indicator goes off, breathe in more slowly.  Step 4. Repeat the exercise regularly.  Do this exercise every hour while you're awake, or as instructed by your healthcare provider.  If you were taught deep breathing and coughing exercises, do them regularly as instructed by your healthcare provider.

## 2024-03-25 NOTE — TRANSFER OF CARE
Anesthesia Transfer of Care Note    Patient: Duane Ocasio    Procedure(s) Performed: Procedure(s) (LRB):  ORIF, FRACTURE, 4TH METACARPAL BONE, RIGHT (Right)    Patient location: PACU    Anesthesia Type: general    Transport from OR: Transported from OR on 2-3 L/min O2 by NC with adequate spontaneous ventilation    Post pain: adequate analgesia    Post assessment: no apparent anesthetic complications and tolerated procedure well    Post vital signs: stable    Level of consciousness: sedated    Nausea/Vomiting: no nausea/vomiting    Complications: none    Transfer of care protocol was followed    Last vitals: Visit Vitals  /81 (BP Location: Left arm, Patient Position: Lying)   Pulse 72   Temp 37.1 °C (98.8 °F) (Temporal)   Resp 12   Ht 6' (1.829 m)   Wt 81.6 kg (180 lb)   SpO2 100%   BMI 24.41 kg/m²

## 2024-03-25 NOTE — ANESTHESIA PROCEDURE NOTES
Peripheral Block    Patient location during procedure: pre-op   Block not for primary anesthetic.  Reason for block: at surgeon's request and post-op pain management   Post-op Pain Location: Right hand pain   Start time: 3/25/2024 11:35 AM  Timeout: 3/25/2024 11:30 AM   End time: 3/25/2024 11:40 AM    Staffing  Authorizing Provider: Willis Kay MD  Performing Provider: Willis Kay MD    Staffing  Performed by: Willis Kay MD  Authorized by: Willis Kay MD    Preanesthetic Checklist  Completed: patient identified, IV checked, site marked, risks and benefits discussed, surgical consent, monitors and equipment checked, pre-op evaluation and timeout performed  Peripheral Block  Patient position: supine  Prep: ChloraPrep  Patient monitoring: heart rate, cardiac monitor, continuous pulse ox, continuous capnometry and frequent blood pressure checks  Block type: supraclavicular  Laterality: right  Injection technique: single shot  Needle  Needle type: Stimuplex   Needle gauge: 22 G  Needle length: 2 in  Needle localization: anatomical landmarks and ultrasound guidance   -ultrasound image captured on disc.  Assessment  Injection assessment: negative aspiration, negative parasthesia and local visualized surrounding nerve  Paresthesia pain: none  Heart rate change: no  Slow fractionated injection: yes    Medications:    Medications: BUPivacaine liposome (PF) 1.3 % (13.3 mg/mL) suspension - Injection   10 mL - 3/25/2024 11:35:00 AM  bupivacaine (pf) (MARCAINE) injection 0.5% - Perineural   5 mL - 3/25/2024 11:35:00 AM    Additional Notes  VSS.  [facility] RN monitoring vitals throughout procedure.  Patient tolerated procedure well.

## 2024-03-25 NOTE — PLAN OF CARE
Patient stated that girlfriend called an [employer] for him to go home with.  Nurse stated that patient's need a family member or friend to bring them home that [employer] cannot be responsible for the patient when under anesthesia.  Awaiting girlfriend to come get patient after getting child from school in [place].

## 2024-03-25 NOTE — ANESTHESIA POSTPROCEDURE EVALUATION
Anesthesia Post Evaluation    Patient: Duane Ocasio    Procedure(s) Performed: Procedure(s) (LRB):  ORIF, FRACTURE, 4TH METACARPAL BONE, RIGHT (Right)    Final Anesthesia Type: general      Patient location during evaluation: PACU  Patient participation: Yes- Able to Participate  Level of consciousness: awake  Post-procedure vital signs: reviewed and stable  Pain management: adequate  Airway patency: patent    PONV status at discharge: No PONV  Anesthetic complications: no      Cardiovascular status: blood pressure returned to baseline  Respiratory status: unassisted  Hydration status: euvolemic  Follow-up not needed.              Vitals Value Taken Time   /73 03/25/24 1525   Temp 36.5 °C (97.7 °F) 03/25/24 1410   Pulse 63 03/25/24 1525   Resp 20 03/25/24 1525   SpO2 95 % 03/25/24 1525         Event Time   Out of Recovery 15:25:00         Pain/Miranda Score: Pain Rating Prior to Med Admin: 7 (3/25/2024  3:05 PM)  Pain Rating Post Med Admin: 5 (3/25/2024  3:34 PM)  Miranda Score: 10 (3/25/2024  3:00 PM)  Modified Miranda Score: 20 (3/25/2024  3:31 PM)

## 2024-03-25 NOTE — ANESTHESIA PREPROCEDURE EVALUATION
03/25/2024  Duane Ocasio is a 38 y.o., male.      Pre-op Assessment    I have reviewed the Patient Summary Reports.     I have reviewed the Nursing Notes. I have reviewed the NPO Status.   I have reviewed the Medications.     Review of Systems  Anesthesia Hx:  No previous Anesthesia                Cardiovascular:  Cardiovascular Normal                                            Pulmonary:    Asthma       Asthma:               Neurological:  Neurology Normal                                          Physical Exam  General: Well nourished    Airway:  Mallampati: II   Mouth Opening: Normal  TM Distance: Normal  Neck ROM: Normal ROM        Anesthesia Plan  Type of Anesthesia, risks & benefits discussed:    Anesthesia Type: Gen ETT, Regional  Intra-op Monitoring Plan: Standard ASA Monitors  Post Op Pain Control Plan: multimodal analgesia and peripheral nerve block  Induction:  IV  Airway Plan: Video  Informed Consent: Informed consent signed with the Patient and all parties understand the risks and agree with anesthesia plan.  All questions answered.   ASA Score: 2    Ready For Surgery From Anesthesia Perspective.     .

## 2024-03-26 ENCOUNTER — OFFICE VISIT (OUTPATIENT)
Dept: ORTHOPEDICS | Facility: CLINIC | Age: 39
End: 2024-03-26
Payer: MEDICAID

## 2024-03-26 VITALS
BODY MASS INDEX: 24.38 KG/M2 | WEIGHT: 180 LBS | TEMPERATURE: 98 F | RESPIRATION RATE: 20 BRPM | DIASTOLIC BLOOD PRESSURE: 73 MMHG | HEART RATE: 63 BPM | OXYGEN SATURATION: 95 % | SYSTOLIC BLOOD PRESSURE: 131 MMHG | HEIGHT: 72 IN

## 2024-03-26 VITALS — WEIGHT: 179.88 LBS | HEIGHT: 72 IN | BODY MASS INDEX: 24.36 KG/M2

## 2024-03-26 DIAGNOSIS — Z87.81 STATUS POST OPEN REDUCTION AND INTERNAL FIXATION (ORIF) OF FRACTURE: Primary | ICD-10-CM

## 2024-03-26 DIAGNOSIS — Z98.890 STATUS POST OPEN REDUCTION AND INTERNAL FIXATION (ORIF) OF FRACTURE: Primary | ICD-10-CM

## 2024-03-26 PROCEDURE — 99024 POSTOP FOLLOW-UP VISIT: CPT | Mod: S$GLB,POP,, | Performed by: ORTHOPAEDIC SURGERY

## 2024-03-26 PROCEDURE — 1160F RVW MEDS BY RX/DR IN RCRD: CPT | Mod: CPTII,S$GLB,, | Performed by: ORTHOPAEDIC SURGERY

## 2024-03-26 PROCEDURE — 1159F MED LIST DOCD IN RCRD: CPT | Mod: CPTII,S$GLB,, | Performed by: ORTHOPAEDIC SURGERY

## 2024-03-26 NOTE — PROGRESS NOTES
[facility]    Subjective:     Chief Complaint:   Chief Complaint   Patient presents with    Right Hand - Post-op Evaluation     POD Right hand 4th metacarpal ORIF 3.25.24 State s pain is present at 7-8/10 Numb, heavy and throbbing       Past Medical History:   Diagnosis Date    Asthma        History reviewed. No pertinent surgical history.    Current Outpatient Medications   Medication Sig    HYDROcodone-acetaminophen (NORCO) 5-325 mg per tablet Take 1 tablet by mouth every 6 (six) hours as needed for Pain.    oxyCODONE-acetaminophen (PERCOCET) 7.5-325 mg per tablet Take 1 tablet by mouth every 6 (six) hours as needed for Pain.     No current facility-administered medications for this visit.       Review of patient's allergies indicates:   Allergen Reactions    Spearmint Anaphylaxis       History reviewed. No pertinent family history.    Social History     Socioeconomic History    Marital status: Single   Tobacco Use    Smoking status: Never    Smokeless tobacco: Never   Substance and Sexual Activity    Alcohol use: Not Currently       History of present illness:  Duane comes in today postop day 1 right hand 4th metacarpal open reduction internal fixation.  Comes in today for dressing check and pain control assessment.  His pain is controlled.  He continues to have regional paresthesias secondary to his nerve block.  His right upper extremity dressing and splint is clean dry and intact and in place.  He is wearing a sling to the right upper extremity as well.    Review of Systems:    Constitution: Negative for chills, fever, and sweats.  Negative for unexplained weight loss.    HENT:  Negative for headaches and blurry vision.    Cardiovascular:Negative for chest pain or irregular heart beat. Negative for hypertension.    Respiratory:  Negative for cough and shortness of breath.    Gastrointestinal: Negative for abdominal pain, heartburn, melena, nausea, and vomitting.    Genitourinary:  Negative  "bladder incontinence and dysuria.    Musculoskeletal:  See HPI for details.     Neurological: Negative for numbness.    Psychiatric/Behavioral: Negative for depression.  The patient is not nervous/anxious.      Endocrine: Negative for polyuria    Hematologic/Lymphatic: Negative for bleeding problem.  Does not bruise/bleed easily.    Skin: Negative for poor would healing and rash    Objective:      Physical Examination:    Vital Signs:  There were no vitals filed for this visit.    Body mass index is 24.4 kg/m².    This a well-developed, well nourished patient in no acute distress.  They are alert and oriented and cooperative to examination.        Right hand exam:  Did not remove splint and and dressing to the right upper extremity today.  He was able to wiggle all digits that were exposed to the right hand.  Capillary refill is brisk to all digits in the right hand.  He is neurovascularly intact throughout the right upper extremity.  He is still has regional paresthesias to the right upper extremity secondary to his nerve block.    Pertinent New Results:    XRAY Report / Interpretation:   No new radiographs taken on today's clinic visit.    Assessment/Plan:      1. Status post right hand 4th metacarpal open reduction internal fixation.    Continue use of the sling to provide support of the arm until sensation returns.  Strongly encouraged range of motion exercises to the digits to reduce stiffness: Flexion and extension.  We will have him back in about 7-10 days for wound check, suture removal, and placement into a moldable splint.    Marcus Alcocer, Physician Assistant, served in the capacity as a "scribe" for this patient encounter.  A "face-to-face" encounter occurred with Dr. Claudio Dean on this date.  The treatment plan and medical decision-making is outlined above. Patient was seen and examined with a chaperone.         This note was created using Dragon voice recognition software that occasionally " misinterpreted phrases or words.

## 2024-03-26 NOTE — PROGRESS NOTES
3/26/24 Very pleased with care given.  States all staff were great. States he has read all discharge instructions and instructions given at Dr. Dean's office today - keep dressing CDI until next visit.

## 2024-04-08 ENCOUNTER — OFFICE VISIT (OUTPATIENT)
Dept: ORTHOPEDICS | Facility: CLINIC | Age: 39
End: 2024-04-08
Payer: MEDICAID

## 2024-04-08 VITALS
WEIGHT: 179.88 LBS | BODY MASS INDEX: 24.36 KG/M2 | DIASTOLIC BLOOD PRESSURE: 64 MMHG | SYSTOLIC BLOOD PRESSURE: 122 MMHG | HEIGHT: 72 IN

## 2024-04-08 DIAGNOSIS — Z98.890 STATUS POST OPEN REDUCTION AND INTERNAL FIXATION (ORIF) OF FRACTURE: Primary | ICD-10-CM

## 2024-04-08 DIAGNOSIS — Z87.81 STATUS POST OPEN REDUCTION AND INTERNAL FIXATION (ORIF) OF FRACTURE: Primary | ICD-10-CM

## 2024-04-08 DIAGNOSIS — S62.324A CLOSED DISPLACED FRACTURE OF SHAFT OF FOURTH METACARPAL BONE OF RIGHT HAND, INITIAL ENCOUNTER: ICD-10-CM

## 2024-04-08 PROCEDURE — 1160F RVW MEDS BY RX/DR IN RCRD: CPT | Mod: CPTII,S$GLB,, | Performed by: PHYSICIAN ASSISTANT

## 2024-04-08 PROCEDURE — 3074F SYST BP LT 130 MM HG: CPT | Mod: CPTII,S$GLB,, | Performed by: PHYSICIAN ASSISTANT

## 2024-04-08 PROCEDURE — 3078F DIAST BP <80 MM HG: CPT | Mod: CPTII,S$GLB,, | Performed by: PHYSICIAN ASSISTANT

## 2024-04-08 PROCEDURE — 99024 POSTOP FOLLOW-UP VISIT: CPT | Mod: S$GLB,,, | Performed by: PHYSICIAN ASSISTANT

## 2024-04-08 PROCEDURE — 1159F MED LIST DOCD IN RCRD: CPT | Mod: CPTII,S$GLB,, | Performed by: PHYSICIAN ASSISTANT

## 2024-04-08 RX ORDER — OXYCODONE AND ACETAMINOPHEN 7.5; 325 MG/1; MG/1
1 TABLET ORAL EVERY 8 HOURS PRN
Qty: 21 TABLET | Refills: 0 | Status: SHIPPED | OUTPATIENT
Start: 2024-04-08 | End: 2024-05-07 | Stop reason: ALTCHOICE

## 2024-04-08 NOTE — PROGRESS NOTES
[facility]  [address]  [place] [address]  Phone: [phone]   [phone]    Patient's PCP:No, Primary Doctor  Referring Provider: No ref. provider found    POST-OP Note:    Subjective:        Chief Complaint:   Chief Complaint   Patient presents with    Right Hand - Post-op Evaluation     F/u XR suture removal ORIF right hand fourth metacarpal 3.25.24. Constant throbbing 8/10 requesting medication refil       Past Medical History:   Diagnosis Date    Asthma        Past Surgical History:   Procedure Laterality Date    OPEN REDUCTION AND INTERNAL FIXATION (ORIF) OF FRACTURE OF METACARPAL BONE Right 3/25/2024    Procedure: ORIF, FRACTURE, 4TH METACARPAL BONE, RIGHT;  Surgeon: Claudio Dean MD;  Location: [facility];  Service: Orthopedics;  Laterality: Right;  Accumed       Current Outpatient Medications   Medication Sig    oxyCODONE-acetaminophen (PERCOCET) 7.5-325 mg per tablet Take 1 tablet by mouth every 8 (eight) hours as needed for Pain.     No current facility-administered medications for this visit.       Review of patient's allergies indicates:   Allergen Reactions    Spearmint Anaphylaxis       History reviewed. No pertinent family history.    Social History     Socioeconomic History    Marital status: Single   Tobacco Use    Smoking status: Never    Smokeless tobacco: Never   Substance and Sexual Activity    Alcohol use: Not Currently       History of present illness:  Duane comes in today 2 weeks status post right hand 4th metacarpal open reduction internal fixation.  Comes in today for routine postop follow-up for wound check, suture removal, and postoperative radiographs.    Review of Systems:    Musculoskeletal:  See HPI       Objective:        Physical Examination:    Vital Signs:   Vitals:    04/08/24 0946   BP: 122/64       Body mass index is 24.4 kg/m².    This a well-developed, well nourished patient in no acute distress.  They are alert and oriented and  cooperative to examination.        Right hand exam:  Skin to right hand clean dry and intact.  No erythema or ecchymosis.  Right dorsal hand incision healing well without wound dehiscence or drainage.  There is some soft tissue swelling.  No warmth, erythema, or fluctuance.  He is neurovascularly intact to all digits in the right hand.  He has expected stiffness especially in the 4th and 5th digits with attempts at flexion/extension.  Capillary refill is brisk to all digits in the right hand.    Pertinent New Results:     XRAY Report / Interpretation:  Three views taken of the right hand today: AP, lateral, and oblique views.  They reveal interval fracture reduction of the right 4th metacarpal with 2 inter frag screws in a dorsally placed plate across the 4th metacarpal without evidence of hardware failure.     Assessment:       1. Status post open reduction and internal fixation (ORIF) of fracture    2. Closed displaced fracture of shaft of fourth metacarpal bone of right hand, initial encounter      Plan:     Status post open reduction and internal fixation (ORIF) of fracture  -     X-Ray Hand 3 view Right    Closed displaced fracture of shaft of fourth metacarpal bone of right hand, initial encounter  -     oxyCODONE-acetaminophen (PERCOCET) 7.5-325 mg per tablet; Take 1 tablet by mouth every 8 (eight) hours as needed for Pain.  Dispense: 21 tablet; Refill: 0        Follow up for 4 wk f/u, //XR// s/p right 4th Metacarpal ORIF 3/25/24.    Sutures were removed from the right hand today.  He tolerated this well.  He was advised to clean the operative site once a day with warm soapy water and not apply any topical creams or ointments.  Do not submerge operative site in a pool or bathtub type environment for least 1 more week.  He was placed into an ulnar gutter splint to be worn at all times.  He can remove once a day briefly for personal hygiene purposes.  Like to see him back in 4 weeks for recheck, radiographs,  and referral to outpatient occupational therapy at that time to work on range of motion.  Encouraged him to discontinue use of the sling to perform flexion/extension at the elbow and pronation/supination of the forearm.  I did provide him a refill of his pain medication.      Marcus Alcocer, MARKS, PA-C    This note was created using [employer] voice recognition software that occasionally misinterprets words or phrases.

## 2024-05-07 ENCOUNTER — TELEPHONE (OUTPATIENT)
Dept: ORTHOPEDICS | Facility: CLINIC | Age: 39
End: 2024-05-07

## 2024-05-07 ENCOUNTER — OFFICE VISIT (OUTPATIENT)
Dept: ORTHOPEDICS | Facility: CLINIC | Age: 39
End: 2024-05-07
Payer: MEDICAID

## 2024-05-07 VITALS
BODY MASS INDEX: 24.36 KG/M2 | WEIGHT: 179.88 LBS | HEIGHT: 72 IN | DIASTOLIC BLOOD PRESSURE: 70 MMHG | SYSTOLIC BLOOD PRESSURE: 126 MMHG

## 2024-05-07 DIAGNOSIS — Z98.890 STATUS POST OPEN REDUCTION AND INTERNAL FIXATION (ORIF) OF FRACTURE: Primary | ICD-10-CM

## 2024-05-07 DIAGNOSIS — Z87.81 STATUS POST OPEN REDUCTION AND INTERNAL FIXATION (ORIF) OF FRACTURE: Primary | ICD-10-CM

## 2024-05-07 PROCEDURE — 1160F RVW MEDS BY RX/DR IN RCRD: CPT | Mod: CPTII,S$GLB,, | Performed by: ORTHOPAEDIC SURGERY

## 2024-05-07 PROCEDURE — 1159F MED LIST DOCD IN RCRD: CPT | Mod: CPTII,S$GLB,, | Performed by: ORTHOPAEDIC SURGERY

## 2024-05-07 PROCEDURE — 3074F SYST BP LT 130 MM HG: CPT | Mod: CPTII,S$GLB,, | Performed by: ORTHOPAEDIC SURGERY

## 2024-05-07 PROCEDURE — 3078F DIAST BP <80 MM HG: CPT | Mod: CPTII,S$GLB,, | Performed by: ORTHOPAEDIC SURGERY

## 2024-05-07 PROCEDURE — 99024 POSTOP FOLLOW-UP VISIT: CPT | Mod: S$GLB,,, | Performed by: ORTHOPAEDIC SURGERY

## 2024-05-07 RX ORDER — HYDROCODONE BITARTRATE AND ACETAMINOPHEN 7.5; 325 MG/1; MG/1
1 TABLET ORAL EVERY 8 HOURS PRN
Qty: 21 TABLET | Refills: 0 | Status: SHIPPED | OUTPATIENT
Start: 2024-05-07 | End: 2024-05-14

## 2024-05-07 NOTE — PROGRESS NOTES
[facility]    Subjective:     Chief Complaint:   Chief Complaint   Patient presents with    Right Hand - Post-op Evaluation     F/u XR right 4th metacarpal ORIF 3.25.24. Still having aching to throbbing pain, swelling and issue to ROM. Current pain level is 7-8/10       Past Medical History:   Diagnosis Date    Asthma        Past Surgical History:   Procedure Laterality Date    OPEN REDUCTION AND INTERNAL FIXATION (ORIF) OF FRACTURE OF METACARPAL BONE Right 3/25/2024    Procedure: ORIF, FRACTURE, 4TH METACARPAL BONE, RIGHT;  Surgeon: Claudio Dean MD;  Location: [facility];  Service: Orthopedics;  Laterality: Right;  Accumed       Current Outpatient Medications   Medication Sig    oxyCODONE-acetaminophen (PERCOCET) 7.5-325 mg per tablet Take 1 tablet by mouth every 8 (eight) hours as needed for Pain. (Patient not taking: Reported on 5/7/2024)     No current facility-administered medications for this visit.       Review of patient's allergies indicates:   Allergen Reactions    Spearmint Anaphylaxis       No family history on file.    Social History     Socioeconomic History    Marital status: Single   Tobacco Use    Smoking status: Never    Smokeless tobacco: Never   Substance and Sexual Activity    Alcohol use: Not Currently       History of present illness:  Patient comes in today for his hand.  He is generally doing well.  He is 5 weeks out      Review of Systems:    Constitution: Negative for chills, fever, and sweats.  Negative for unexplained weight loss.    HENT:  Negative for headaches and blurry vision.    Cardiovascular:Negative for chest pain or irregular heart beat. Negative for hypertension.    Respiratory:  Negative for cough and shortness of breath.    Gastrointestinal: Negative for abdominal pain, heartburn, melena, nausea, and vomitting.    Genitourinary:  Negative bladder incontinence and dysuria.    Musculoskeletal:  See HPI for details.     Neurological: Negative for  numbness.    Psychiatric/Behavioral: Negative for depression.  The patient is not nervous/anxious.      Endocrine: Negative for polyuria    Hematologic/Lymphatic: Negative for bleeding problem.  Does not bruise/bleed easily.    Skin: Negative for poor would healing and rash    Objective:      Physical Examination:    Vital Signs:    Vitals:    05/07/24 0953   BP: 126/70       Body mass index is 24.4 kg/m².    This a well-developed, well nourished patient in no acute distress.  They are alert and oriented and cooperative to examination.        Wounds are clean dry and intact is neurovascular intact the finger tips  Pertinent New Results:    XRAY Report / Interpretation:   Three views of the right hand demonstrate no obvious change in position of the fracture.  The hardware is in good position    Assessment/Plan:      Stable following open reduction internal fixation of a metacarpal fracture.  Follow-up in 6 weeks start occupational therapy      This note was created using Dragon voice recognition software that occasionally misinterpreted phrases or words.

## 2024-05-07 NOTE — LETTER
May 7, 2024      [facility]  [address]  [place] [address]  Phone: [phone]  Fax: [phone]       Patient: Duane Ocasio   YOB: 1985  Date of Visit: 05/07/2024    To Whom It May Concern:    Rodrigue Ocasio  was at our office on 05/07/2024.  He is unable to work for six weeks due to hand surgery.       Sincerely,      Claudio Dean MD

## 2024-05-08 ENCOUNTER — CLINICAL SUPPORT (OUTPATIENT)
Dept: REHABILITATION | Facility: HOSPITAL | Age: 39
End: 2024-05-08
Payer: MEDICAID

## 2024-05-08 DIAGNOSIS — M25.641 DECREASED RANGE OF MOTION OF FINGER OF RIGHT HAND: Primary | ICD-10-CM

## 2024-05-08 DIAGNOSIS — Z98.890 STATUS POST OPEN REDUCTION AND INTERNAL FIXATION (ORIF) OF FRACTURE: ICD-10-CM

## 2024-05-08 DIAGNOSIS — Z87.81 STATUS POST OPEN REDUCTION AND INTERNAL FIXATION (ORIF) OF FRACTURE: ICD-10-CM

## 2024-05-08 PROBLEM — M25.649 DECREASED RANGE OF MOTION OF FINGER: Status: ACTIVE | Noted: 2024-05-08

## 2024-05-08 PROCEDURE — 97530 THERAPEUTIC ACTIVITIES: CPT | Mod: PN

## 2024-05-08 PROCEDURE — 97165 OT EVAL LOW COMPLEX 30 MIN: CPT | Mod: PN

## 2024-05-08 NOTE — PLAN OF CARE
[facility] Occup[facility]  Initial Evaluation   Date: 5/8/2024  Name: Duane Ocasio  [facility] Number: [id number]  Therapy Diagnosis: right hand decreased ROM and pain  Physician: Claudio Dean MD  Physician Orders: 2-3 times week for 6 weeks  Medical Diagnosis: Z98.890,Z87.81 (ICD-10-CM) - Status post open reduction and internal fixation (ORIF) of fracture  displaced fracture of right 4th metacarpal  Surgical Procedure and Date: ORIF of right 4th metacarpal,  3/25/2024   Evaluation Date: 5/8/2024  Insurance Authorization Period Expiration: 12/31/24  Plan of Care Certification Period: 6/30/24  Date of Return to MD: 6/04/24  Visit # / Visits authorized: Mak 1/1  Time In:2:30  Time Out: 3:15  Total Billable Time: 8 minutes     Precautions:  Standard   post op    Subjective   Patient states: Have more pain in the other fingers especially the long finger when makes a fist. Tingling in all fingers  when move fingers.  Involved Side: right  Dominant Side: Right  Date of Onset: injury  3/11/24  History of Current Condition/Mechanism of Injury: Per Emergency Dept note fell off chair injured hand as tried to catch self. He was Xrays taken, fracture of 4th metacarpal and placed in ulnar gutter. Seen by ortho on 3/19 and surgery was scheduled.   Imaging: Xray Three views of the right hand demonstrate no obvious change in position of the fracture. The hardware is in good position   Previous Therapy: none    Past Medical History/Physical Systems Review:    has a past medical history of Asthma.   has a past surgical history that includes Open reduction and internal fixation (ORIF) of fracture of metacarpal bone (Right, 3/25/2024).  has a current medication list which includes the following prescription(s): hydrocodone-acetaminophen.    Review of patient's allergies indicates:   Allergen Reactions    Spearmint Anaphylaxis      Patient's Goals for Therapy: get my hand back to normal so I can use it  again    Pain:  Functional Pain Scale Rating 0-10: at eval  8/10    8/10 at least  9/10 at worst  Location: right hand  Description: throbbing    Aggravating Factors: movement of fingers  Easing Factors: every 4-6 hours oxycodone  try to take only at night to be able to sleep    Occupation:    at [employer] [place]  Working: unable since surgery    Functional Limitations/Social History:  Previous functional status includes: Independent with all self care and home management.   Cooked,  household activities  Current Functional Status   Home/Living environment : lives with his family   has 2 children   3 y/o and 16 y/o      ADL Assessment  ADL    Dressing Use of right thumb to assist, limits use of other fingers,  assist for some things   Eating Use of left to feed self    Cooking Unable    Bathing/Grooming Using left for brushing teeth; assist and use of left for bathing   Household tasks Unable at this time   By patient report             FOTO score: eval 21%   Predicted Goal  55%  Objective   Observation: mild swelling throughout hand  well healed incision site    Palpation:  tenderness throughout ulnar aspect of the hand  A/PROM Wrist 45/60 ext;   flexion 35/45  read dev  20  ulnar dev  25  pain with all movements  AROM   5/8/2024         RIGHT                                                                               INDEX   LONG      RING     LITTLE                MCP Flexion    60           70         10-55        75  PIP Flexion      95           95          20-55       60  DIP Flexion      55           50           0-25        35       Left  ring MP 80   PIP  105  DIP 65  Able to touch thumb to each finger with effort to ring and little  Treatment   Treatment Time In: 3:06  Treatment Time Out: 3:15  Total Treatment time separate from Evaluation time:9 minutes   Patient participated in dynamic functional therapeutic activities for 9 minutes to improve functional performance including:  -AROM wrist all  planes  -tendon glides, Proximal interphalangeal joint and Distal interphalangeal joint blocking flexion, Proximal interphalangeal joint blocking extension, finger extension off table top each finger and composite, finger adduction and abduction   Discussed importance of removing splint several times a day for exercise and to try using hand (with splint on) for light activities such as finger foods folding small articles of clothing  Home Exercise Program/Education:  Issued HEP (see patient instructions in EMR) and educated on modality use for pain management . Exercises were reviewed and he was able to demonstrate them prior to the end of the session. Pt received a written copy of exercises to perform at home.  He demonstrated good  understanding of the education provided.  Pt was advised to perform these exercises free of pain  Patient Education: role of OT, goals for OT, scheduling/cancellations - pt verbalized understanding. Discussed insurance limitations with patient.    Assessment     Patient is a 38 y.o. right handed male presenting to skilled OT with diagnosis of status post ORIF of right 4th metacarpal fracture on 3/25/24 sustained on 3/11/24 when he fell off a chair.  Patient with pain of 8/10 to 9/10 described as throbbing and pain into other fingers.  He is tender to palpation at ulnar aspect of the hand.  He has mild swelling throughout hand and into fingers.  His ROM is moderately limited.  He reports assistance and primarily left hand use only with self care and unable to perform most daily activities. His FOTO score: 21%.  A brief history was obtained from the patient and MD note.  During the evaluation, the patient required no modification or assistance.  There are no anticipated barriers/co-morbid conditions/personal factors may affect the plan of care.   Patient will benefit from OT to address problems hindering functional use of UE. The following goals were discussed with the patient and patient  is in agreement with them as to be addressed in the treatment plan. The patient's rehab potential is Good. Patient's educational, spiritual, cultural needs were considered.       Goals:   Short Term Goals  Independent in home program of ROM in 2 visits-ongoing  Patient with decrease in worst pain to 5/10 and increase in ROM by up to 15 dgs 6 visits  Patient with decrease in pain and increase in ROM allowing for greater ease with ability to use right to assist with self care activities and folding of lightweight clothing in 6 visits  Long Term Goals  Patient with decrease in worst pain to 3/10 and increase in ROM by up to 30 degrees and increase in strength to WNL in 12 visits  Patient with decrease in pain and increase in ROM allowing for greater ease with cooking activities  in 10 visits  3.   Improve FOTO score by 25 % indicating improved function of right hand in 12 visits  Plan   Outpatient Occupational Therapy 2 times weekly for 5 weeks then may decrease to 1 time a week for 2 weeks (will reassess periodically and adjust as needed) to include the following interventions:  Home Exercise and Stretching, Patient Education, Therapeutic Exercise (47191) - Improve muscle strength, ROM, flexibility and muscle function, Manual Stretching (98688) - Passive or Active stretching to improve muscle length and function, Soft Tissue Mobs (18744) - Increase ROM tissue length, joint mechanics and modulate pain, Cryotherapy (10395) - Application of cold to decrease local swelling and decrease pain , Heat (60345) -  Application of heat to increase local circulation and decrease pain, Whirlpool/fluidotherapy (05432) - Increase local tissue circulation, improve elasticity of tissues, increased blood flow for improved muscle strength, ROM, flexibility and function, Therapeutic activities (52950) use of dynamic activities to improve functional performance, Kinesio taping.      MARLEY Fried

## 2024-05-20 ENCOUNTER — CLINICAL SUPPORT (OUTPATIENT)
Dept: REHABILITATION | Facility: HOSPITAL | Age: 39
End: 2024-05-20
Payer: MEDICAID

## 2024-05-20 DIAGNOSIS — M25.641 DECREASED RANGE OF MOTION OF FINGER OF RIGHT HAND: Primary | ICD-10-CM

## 2024-05-20 PROCEDURE — 97530 THERAPEUTIC ACTIVITIES: CPT | Mod: PN

## 2024-05-20 NOTE — PROGRESS NOTES
[facility] Daily Treatment Note   Date: 5/20/2024   Patient not seen since 5/08 for evaluation.  Name: Duane Ocasio  [facility] Number: [id number]  Therapy Diagnosis: right hand decreased ROM and pain  Physician: Claudio Dean MD  Physician Orders: 2-3 times week for 6 weeks  Medical Diagnosis: Z98.890,Z87.81 (ICD-10-CM) - Status post open reduction and internal fixation (ORIF) of fracture  displaced fracture of right 4th metacarpal  Surgical Procedure and Date: ORIF of right 4th metacarpal,  3/25/2024   Evaluation Date: 5/8/2024  Insurance Authorization Period Expiration: 12/31/24  Plan of Care Certification Period: 6/30/24  Date of Return to MD: 6/04/24  Visit # / Visits authorized: Mak 1/1 1/20  Time In:1:57  Time Out: 2:31  Total Billable Time: 32 minutes    Precautions:  Standard post op     Subjective   Pt reports: Pain (constant)  along here (ulnar aspect of the hand) when move it. Better mobility. Pain  if you touch it indicating along ring dorsal metacarpal and lateral hypothenar area.   was compliant with home exercise program given    Response to previous treatment:little better  Functional change: can feed self  try to  toothbrush, and bathe a little  Pain: 7/10  Location: right hand      Objective    Patient received the following treatment:    Therapeutic activities to improve functional performance with use of dynamic activities for 32 minutes including:   Fluidotherapy to right hand for 10 minutes to increase tissue elasticity, desensitization, sensory re-education and for pain management. Air flow 80  heat 112 degrees with Active range of motion in it    -Scar massage to right area for 3 minutes to decrease dense adhesions and improve tensile glide.   -Soft tissue mobilization to right hand area for 2 minutes  to increase tissue elasticity and decrease pain   -Active assistive/Passive range of motion of ring and little fingers each joint into flexion  and composite flexion of each finger, place and hold in table top  -Tendon glides with assist  -Thumb to each fingertip  -intrinsic squeezes with foam letter 15 repetitions each web space      Instruction and demonstration given of all introduced   Patient required moderate cuing for correct performance of exercise.    Home Exercises and Education Provided   Education provided:  - discussed insurance limitation  - Progress towards goals     Written Home Exercises Provided: 5/20/24 Fingers adduction and abduction with hand able heart 15 repetitions each web space 2-3 times a day for mobility and swelling. Place left fingers on surgical site increase pressure as tolerated.  Patient instructed to cont prior HEP.  Exercises were reviewed and he was able to demonstrate them prior to the end of the session. he demonstrated good  understanding of the HEP provided.   See EMR under Patient Instructions for exercises provided prior visit.     Assessment   Patient continues with high level of constant pain. He notes pain at lateral hypothenar with movement and at surgical site with any palpation; very hypersensitive.  Swelling persisting at hand and all fingers. He is lacking ~ 15-20 dgs of MP extension at ring and little finger. His flexion passively of each ring and little finger joints is almost full however, with great complaints of pain. No tightness noted at each joint. MP flexion lacking greater than Proximal interphalangeal joint and Distal interphalangeal joint flexion at ring and little with composite flexion. Complaints of pain with all movements. Noted less pain with movement in fluidotherapy. Pt  will continue to benefit from skilled OT to further address pain and deficits in ROM and strength which are hindering ability to perform self care and IADLs. Patient's cultural,spiritual, and educational needs were considered    Goals  Short Term Goals  Independent in home program of ROM in 2 visits-met  Patient with  decrease in worst pain to 5/10 and increase in ROM by up to 15 dgs 6 visits  Patient with decrease in pain and increase in ROM allowing for greater ease with ability to use right to assist with self care activities and folding of lightweight clothing in 6 visits  Plan   Continue skilled therapy 2 times a week for 5 weeks (will periodically reassess and adjust as needed) including therapeutic exercises and activities, manual therapy, and pain modalities  as needed   next session: gross grasp of rice      MARLEY Fried

## 2024-05-22 ENCOUNTER — DOCUMENTATION ONLY (OUTPATIENT)
Dept: REHABILITATION | Facility: HOSPITAL | Age: 39
End: 2024-05-22
Payer: MEDICAID

## 2024-05-22 NOTE — PROGRESS NOTES
Patient called to cancel his scheduled OT appt for today (5/22/24) due to appt time no longer works.  MARLEY Fried

## 2024-05-29 ENCOUNTER — DOCUMENTATION ONLY (OUTPATIENT)
Dept: REHABILITATION | Facility: HOSPITAL | Age: 39
End: 2024-05-29
Payer: MEDICAID

## 2024-06-03 ENCOUNTER — DOCUMENTATION ONLY (OUTPATIENT)
Dept: REHABILITATION | Facility: HOSPITAL | Age: 39
End: 2024-06-03
Payer: MEDICAID

## 2024-06-03 ENCOUNTER — TELEPHONE (OUTPATIENT)
Dept: ORTHOPEDICS | Facility: CLINIC | Age: 39
End: 2024-06-03

## 2024-06-03 NOTE — TELEPHONE ENCOUNTER
----- Message from Annette Tafoya OT sent at 6/3/2024  9:49 AM CDT -----  Regarding: patient attendance  Patient has an appt with Dr. Dean tomorrow 6/04. Patient attended eval and one treatment only, last seen on 5/20. He did not schedule between eval on 5/08/24 and 5/20. He canceled on 5/22 and no showed on 5/29 and 6/03. We attempted to contact patient but was unsuccessful. He is being removed from the schedule; all follow treatments scheduled are being canceled. Thank you, Annette

## 2024-06-03 NOTE — PROGRESS NOTES
Patient did not show for his scheduled OT appt on 6/03/24. Therapist attempted to contact him: no answer and voice mail not set up. All of his follow up appts scheduled will be canceled due to cancellation and 2 no shows. MARLEY Fried

## 2024-06-19 ENCOUNTER — DOCUMENTATION ONLY (OUTPATIENT)
Dept: REHABILITATION | Facility: HOSPITAL | Age: 39
End: 2024-06-19
Payer: MEDICAID

## 2024-06-19 NOTE — PROGRESS NOTES
[facility] Daily Discharge Note   Date: 6/19/24  last seen 5/20/2024 and 5/08 for evaluation.  Name: Duane Ocasio  Clinic Number: [id number]  Therapy Diagnosis: right hand decreased ROM and pain  Physician: Claudio Dean MD  Medical Diagnosis: Z98.890,Z87.81 (ICD-10-CM) - Status post open reduction and internal fixation (ORIF) of fracture  displaced fracture of right 4th metacarpal  Surgical Procedure and Date: ORIF of right 4th metacarpal,  3/25/2024   Evaluation Date: 5/8/2024  Visits authorized: Eval and 1 treatment  3 no shows 5/22, 5/29, and 6/03     Subjective as of 5/20/24   Pt reports: Pain (constant)  along here (ulnar aspect of the hand) when move it. Better mobility. Pain  if you touch it indicating along ring dorsal metacarpal and lateral hypothenar area.   was compliant with home exercise program given    Functional change: can feed self  try to  toothbrush, and bathe a little  Pain: 7/10  ADL Assessment as of 5/08/24  ADL     Dressing Use of right thumb to assist, limits use of other fingers,  assist for some things   Eating Use of left to feed self    Cooking Unable    Bathing/Grooming Using left for brushing teeth; assist and use of left for bathing   Household tasks Unable at this time   By patient report                        FOTO score: eval 21%   Predicted Goal  55%  Objective  as of 5/20/24   Observation: mild swelling throughout hand  well healed incision site  Palpation:  tenderness throughout ulnar aspect of the hand    A/PROM Wrist 45/60 ext;   flexion 35/45  read dev  20  ulnar dev  25  pain with all movements  AROM   5/8/2024         RIGHT                                                                               INDEX   LONG      RING     LITTLE                MCP Flexion    60           70         10-55        75  PIP Flexion      95           95          20-55       60  DIP Flexion      55           50           0-25        35       Left   ring MP 80   PIP  105  DIP 65  Able to touch thumb to each finger with effort to ring and little    Assessment  as of 5/20/24   Patient continues with high level of constant pain. He notes pain at lateral hypothenar with movement and at surgical site with any palpation; very hypersensitive.  Swelling persisting at hand and all fingers. He is lacking ~ 15-20 dgs of MP extension at ring and little finger. His flexion passively of each ring and little finger joints is almost full however, with great complaints of pain. No tightness noted at each joint. MP flexion lacking greater than Proximal interphalangeal joint and Distal interphalangeal joint flexion at ring and little with composite flexion. Complaints of pain with all movements. Noted less pain with movement in fluidotherapy.  Goals   NOT MET due to non compliance  Short Term Goals  Independent in home program of ROM in 2 visits-met  Patient with decrease in worst pain to 5/10 and increase in ROM by up to 15 dgs 6 visits  Patient with decrease in pain and increase in ROM allowing for greater ease with ability to use right to assist with self care activities and folding of lightweight clothing in 6 visits  Long Term Goals  Patient with decrease in worst pain to 3/10 and increase in ROM by up to 30 degrees and increase in strength to WNL in 12 visits  Patient with decrease in pain and increase in ROM allowing for greater ease with cooking activities  in 10 visits  3.   Improve FOTO score by 25 % indicating improved function of right hand in 12 visits    Plan   Discharge from skilled therapy due to noncompliance.    MARLEY Fried

## (undated) DEVICE — SUT MONOCRYL 4-0 PS-2

## (undated) DEVICE — SUT BLU BR 2 TAPERD NDL 1/2

## (undated) DEVICE — CONTAINER SPECIMEN OR STER 4OZ

## (undated) DEVICE — SOL POVIDONE SCRUB IODINE 4 OZ

## (undated) DEVICE — ADHESIVE MASTISOL VIAL 48/BX

## (undated) DEVICE — BLADE SURG #15 CARBON STEEL

## (undated) DEVICE — NDL BOX COUNTER

## (undated) DEVICE — SLING ORTHOPEDIC LARGE

## (undated) DEVICE — SUT ETHILON 4-0 PS2 18 BLK

## (undated) DEVICE — TOURNIQUET SB QC DP 18X4IN

## (undated) DEVICE — APPLICATOR CHLORAPREP ORN 26ML

## (undated) DEVICE — POSITIONER ARM 16X12.5X8.75IN

## (undated) DEVICE — DRAPE U SPLIT SHEET 54X76IN

## (undated) DEVICE — PACK SIRUS BASIC V SURG STRL

## (undated) DEVICE — GLOVE SENSICARE PI GRN 7

## (undated) DEVICE — GLOVE SENSICARE PI GRN 8

## (undated) DEVICE — SLEEVE SCD EXPRESS KNEE MEDIUM

## (undated) DEVICE — ELECTRODE REM PLYHSV RETURN 9

## (undated) DEVICE — SOCKINETTE IMPERVIOUS 12X48IN

## (undated) DEVICE — GLOVE SURG ULTRA TOUCH 8.5

## (undated) DEVICE — DRAPE EXTREMITY ORTHOMAX

## (undated) DEVICE — PAD CAST SPECIALIST STRL 4

## (undated) DEVICE — GOWN POLY REINF BRTH SLV LG

## (undated) DEVICE — Device

## (undated) DEVICE — DRESSING GAUZE XEROFORM 5X9

## (undated) DEVICE — DRAPE C ARM 42 X 120 10/BX

## (undated) DEVICE — GOWN POLY REINF BRTH SLV XL

## (undated) DEVICE — PADDING CAST 4IN SPECIALIST

## (undated) DEVICE — DRAPE INVISISHIELD TOWEL SMALL

## (undated) DEVICE — TRAY DRY SPONGE SCRUB W FOAM

## (undated) DEVICE — SPONGE BULKEE II ABSRB 6X6.75

## (undated) DEVICE — GLOVE SENSICARE PI ALOE 7

## (undated) DEVICE — SCRUB 10% POVIDONE IODINE 4OZ

## (undated) DEVICE — TOWEL OR DISP STRL BLUE 4/PK

## (undated) DEVICE — BANDAGE ESMARK ELASTIC ST 4X9

## (undated) DEVICE — SUT 3-0 VICRYL / SH (J416)

## (undated) DEVICE — COVER SURG LIGHT HANDLE

## (undated) DEVICE — BOWL STERILE LARGE 32OZ

## (undated) DEVICE — BANDAGE MATRIX HK LOOP 4IN 5YD

## (undated) DEVICE — SPONGE GAUZE 16PLY 4X4

## (undated) DEVICE — DRAPE THREE-QTR REINF 53X77IN

## (undated) DEVICE — SYS LABEL CORRECT MED

## (undated) DEVICE — SKINMARKER W/RULER DEVON

## (undated) DEVICE — STRIP MEDI WND CLSR 1/2X4IN